# Patient Record
Sex: MALE | Race: WHITE | Employment: FULL TIME | URBAN - METROPOLITAN AREA
[De-identification: names, ages, dates, MRNs, and addresses within clinical notes are randomized per-mention and may not be internally consistent; named-entity substitution may affect disease eponyms.]

---

## 2017-04-17 ENCOUNTER — APPOINTMENT (OUTPATIENT)
Dept: GENERAL RADIOLOGY | Age: 57
End: 2017-04-17
Attending: EMERGENCY MEDICINE
Payer: COMMERCIAL

## 2017-04-17 ENCOUNTER — HOSPITAL ENCOUNTER (OUTPATIENT)
Age: 57
Discharge: HOME | End: 2017-04-17
Attending: EMERGENCY MEDICINE
Payer: COMMERCIAL

## 2017-04-17 VITALS
DIASTOLIC BLOOD PRESSURE: 79 MMHG | OXYGEN SATURATION: 98 % | HEART RATE: 104 BPM | RESPIRATION RATE: 18 BRPM | WEIGHT: 150 LBS | BODY MASS INDEX: 22.73 KG/M2 | TEMPERATURE: 99 F | HEIGHT: 68 IN | SYSTOLIC BLOOD PRESSURE: 152 MMHG

## 2017-04-17 DIAGNOSIS — S63.634A SPRAIN OF INTERPHALANGEAL JOINT OF RIGHT RING FINGER, INITIAL ENCOUNTER: Primary | ICD-10-CM

## 2017-04-17 DIAGNOSIS — S60.221A CONTUSION OF RIGHT HAND, INITIAL ENCOUNTER: ICD-10-CM

## 2017-04-17 PROCEDURE — 73140 X-RAY EXAM OF FINGER(S): CPT

## 2017-04-17 PROCEDURE — 99203 OFFICE O/P NEW LOW 30 MIN: CPT

## 2017-04-17 NOTE — ED INITIAL ASSESSMENT (HPI)
Pt sts he tripped down a flight of stairs last night and caught himself with his hand. +swelling and ecchymosis to the 4th digit on the right hand. +numbness/tingling to finger.

## 2017-04-17 NOTE — ED NOTES
Patient offered a finger splint. Pt sts \"Can I see what one looks like so i can go buy one myself so it will be cheaper\" pt showed finger splint. Pt verbalized understanding as to what to purchase.

## 2017-04-17 NOTE — ED PROVIDER NOTES
Patient Seen in: [facility]    History   Patient presents with:  Hand Pain    Stated Complaint: RT.  HAND PAIN    HPI  Patient states his slipper caught on the edge of the vacuum  and he fell onto an outstretched ha Current:/79 mmHg  Pulse 104  Temp(Src) 98.8 °F (37.1 °C) (Oral)  Resp 18  Ht 172.7 cm (5' 8\")  Wt 68.04 kg  BMI 22.81 kg/m2  SpO2 98%        Physical Exam   Constitutional: He is oriented to person, place, and time.  He appears well-developed and wel Disposition:  Discharge    Follow-up:  Antelmo Harper MD  [place] [id number]    Schedule an appointment as soon as possible for a visit in 3 days  Follow up with your doctor is important      Medi

## 2017-06-12 PROCEDURE — 88305 TISSUE EXAM BY PATHOLOGIST: CPT | Performed by: INTERNAL MEDICINE

## 2018-12-12 PROCEDURE — 88304 TISSUE EXAM BY PATHOLOGIST: CPT

## 2019-08-01 ENCOUNTER — HOSPITAL ENCOUNTER (OUTPATIENT)
Dept: GENERAL RADIOLOGY | Facility: HOSPITAL | Age: 59
Discharge: HOME | End: 2019-08-01
Attending: ORTHOPAEDIC SURGERY
Payer: COMMERCIAL

## 2019-08-01 ENCOUNTER — OFFICE VISIT (OUTPATIENT)
Dept: ORTHOPEDICS CLINIC | Facility: CLINIC | Age: 59
End: 2019-08-01
Payer: COMMERCIAL

## 2019-08-01 VITALS — DIASTOLIC BLOOD PRESSURE: 78 MMHG | RESPIRATION RATE: 18 BRPM | SYSTOLIC BLOOD PRESSURE: 126 MMHG | HEART RATE: 71 BPM

## 2019-08-01 DIAGNOSIS — M25.511 CHRONIC RIGHT SHOULDER PAIN: ICD-10-CM

## 2019-08-01 DIAGNOSIS — G89.29 CHRONIC RIGHT SHOULDER PAIN: ICD-10-CM

## 2019-08-01 DIAGNOSIS — M65.331 TRIGGER MIDDLE FINGER OF RIGHT HAND: Primary | ICD-10-CM

## 2019-08-01 DIAGNOSIS — M24.111 LABRAL TEAR OF SHOULDER, DEGENERATIVE, RIGHT: ICD-10-CM

## 2019-08-01 PROCEDURE — 20550 NJX 1 TENDON SHEATH/LIGAMENT: CPT | Performed by: ORTHOPAEDIC SURGERY

## 2019-08-01 PROCEDURE — 99203 OFFICE O/P NEW LOW 30 MIN: CPT | Performed by: ORTHOPAEDIC SURGERY

## 2019-08-01 PROCEDURE — 73030 X-RAY EXAM OF SHOULDER: CPT | Performed by: ORTHOPAEDIC SURGERY

## 2019-08-01 RX ORDER — BETAMETHASONE SODIUM PHOSPHATE AND BETAMETHASONE ACETATE 3; 3 MG/ML; MG/ML
3 INJECTION, SUSPENSION INTRA-ARTICULAR; INTRALESIONAL; INTRAMUSCULAR; SOFT TISSUE ONCE
Status: COMPLETED | OUTPATIENT
Start: 2019-08-01 | End: 2019-08-01

## 2019-08-01 RX ADMIN — BETAMETHASONE SODIUM PHOSPHATE AND BETAMETHASONE ACETATE 3 MG: 3; 3 INJECTION, SUSPENSION INTRA-ARTICULAR; INTRALESIONAL; INTRAMUSCULAR; SOFT TISSUE at 09:19:00

## 2019-08-01 NOTE — PROGRESS NOTES
Per verbal order from KEATON, draw up 1ml of 1% lidocaine and 1ml of Celestone for cortisone injection to right middle finger.  Alanna Biggs

## 2019-08-01 NOTE — PROGRESS NOTES
8/1/2019  Arleen Fire  106/1960  60year old   male  Samson Lainez MD    HPI:   Patient presents with:  Consult: C/o on/off right hand pain for over a month. Also, notices tingling and numbness in his fingers.   Stts when he wakes up in the morning SYSTEMS:   A 12 point review of systems was performed as documented on the intake form and reviewed by me today with pertinent positives and negatives listed in the HPI.     EXAM:   /78 (BP Location: Left arm)   Pulse 71   Resp 18   The patient is hoa evidence of generalized laxity.   He has joint line tenderness without effusion with fine crepitus    IMAGING AND TESTING:  Xrays of the patient's right shoulder with 3 views taken today and reviewed by me reveal overall normal alignment of the glenohumeral

## 2019-08-01 NOTE — PROCEDURES
The patient desired injection. Under sterile conditions and following informed consent, the patient was injected with lidocaine and celestone in their right middle finger flexor tendon sheath.   The patient tolerated the procedure well and there were no co

## 2019-08-02 ENCOUNTER — TELEPHONE (OUTPATIENT)
Dept: ORTHOPEDICS CLINIC | Facility: CLINIC | Age: 59
End: 2019-08-02

## 2019-08-02 NOTE — TELEPHONE ENCOUNTER
Pt procedure/Testing: MRI SHOULDER ARTHROGRAM(CONTRAST ONLY),RIGHT SH  Pt insurance contacted: [employer]                                                                   Rep. Contacted: [email or web address]      Dx: M24.111 - Labral tear of shoulder, degjoelle

## 2019-08-12 ENCOUNTER — HOSPITAL ENCOUNTER (OUTPATIENT)
Dept: GENERAL RADIOLOGY | Facility: HOSPITAL | Age: 59
Discharge: HOME | End: 2019-08-12
Attending: ORTHOPAEDIC SURGERY
Payer: COMMERCIAL

## 2019-08-12 ENCOUNTER — HOSPITAL ENCOUNTER (OUTPATIENT)
Dept: MRI IMAGING | Facility: HOSPITAL | Age: 59
Discharge: HOME | End: 2019-08-12
Attending: ORTHOPAEDIC SURGERY
Payer: COMMERCIAL

## 2019-08-12 DIAGNOSIS — M24.111 LABRAL TEAR OF SHOULDER, DEGENERATIVE, RIGHT: ICD-10-CM

## 2019-08-12 DIAGNOSIS — M25.511 CHRONIC RIGHT SHOULDER PAIN: ICD-10-CM

## 2019-08-12 DIAGNOSIS — G89.29 CHRONIC RIGHT SHOULDER PAIN: ICD-10-CM

## 2019-08-12 PROCEDURE — A9575 INJ GADOTERATE MEGLUMI 0.1ML: HCPCS | Performed by: ORTHOPAEDIC SURGERY

## 2019-08-12 PROCEDURE — 77002 NEEDLE LOCALIZATION BY XRAY: CPT | Performed by: ORTHOPAEDIC SURGERY

## 2019-08-12 PROCEDURE — 23350 INJECTION FOR SHOULDER X-RAY: CPT | Performed by: ORTHOPAEDIC SURGERY

## 2019-08-12 PROCEDURE — 73222 MRI JOINT UPR EXTREM W/DYE: CPT | Performed by: ORTHOPAEDIC SURGERY

## 2019-08-12 RX ORDER — LIDOCAINE HYDROCHLORIDE 10 MG/ML
INJECTION, SOLUTION EPIDURAL; INFILTRATION; INTRACAUDAL; PERINEURAL
Status: DISCONTINUED
Start: 2019-08-12 | End: 2019-08-12 | Stop reason: WASHOUT

## 2019-08-28 PROBLEM — M75.121 NONTRAUMATIC COMPLETE TEAR OF RIGHT ROTATOR CUFF: Status: ACTIVE | Noted: 2019-08-28

## 2020-04-16 PROBLEM — I25.10 CORONARY ARTERY CALCIFICATION: Status: ACTIVE | Noted: 2020-04-16

## 2020-04-16 PROBLEM — M65.312 TRIGGER FINGER OF LEFT THUMB: Status: ACTIVE | Noted: 2020-04-16

## 2020-04-16 PROBLEM — I25.84 CORONARY ARTERY CALCIFICATION: Status: ACTIVE | Noted: 2020-04-16

## 2020-09-18 PROBLEM — Z86.16 HISTORY OF 2019 NOVEL CORONAVIRUS DISEASE (COVID-19): Status: ACTIVE | Noted: 2020-09-18

## 2021-04-08 ENCOUNTER — IMMUNIZATION (OUTPATIENT)
Dept: LAB | Age: 61
End: 2021-04-08
Attending: HOSPITALIST
Payer: COMMERCIAL

## 2021-04-08 DIAGNOSIS — Z23 NEED FOR VACCINATION: Primary | ICD-10-CM

## 2021-04-08 PROCEDURE — 0001A SARSCOV2 VAC 30MCG/0.3ML IM: CPT

## 2021-04-29 ENCOUNTER — IMMUNIZATION (OUTPATIENT)
Dept: LAB | Age: 61
End: 2021-04-29
Attending: HOSPITALIST
Payer: COMMERCIAL

## 2021-04-29 DIAGNOSIS — Z23 NEED FOR VACCINATION: Primary | ICD-10-CM

## 2021-04-29 PROCEDURE — 0002A SARSCOV2 VAC 30MCG/0.3ML IM: CPT

## 2021-12-23 PROBLEM — M65.332 TRIGGER MIDDLE FINGER OF LEFT HAND: Status: ACTIVE | Noted: 2021-12-23

## 2022-01-31 PROBLEM — M65.352 TRIGGER FINGER, LEFT LITTLE FINGER: Status: ACTIVE | Noted: 2022-01-31

## 2022-01-31 PROBLEM — M75.101 ROTATOR CUFF SYNDROME OF RIGHT SHOULDER: Status: ACTIVE | Noted: 2022-01-31

## 2022-01-31 PROBLEM — G56.23 CUBITAL TUNNEL SYNDROME OF BOTH UPPER EXTREMITIES: Status: ACTIVE | Noted: 2022-01-31

## 2022-01-31 PROBLEM — G56.03 BILATERAL CARPAL TUNNEL SYNDROME: Status: ACTIVE | Noted: 2022-01-31

## (undated) NOTE — ED AVS SNAPSHOT
[facility] in [address] [place] [address] Patrick Willson    Phone:  [phone]    Fax:  [phone]           Heather Gee   MRN: [id number]    Department:  [facility] in [address] [place] [address]   Date of Visit:  4 follow-up as instructed. Your diagnosis was made based on your presentation today. Symptoms may change or worsen over time, or new symptoms my develop. If you are not improving as expected see your doctor or return sooner than  we discussed.  If your are care or specialist physician may see patients referred from the [facility]. Follow-up care is at the discretion of that Physician.   If you need additional assistance selecting a physician, you may call the Zee Fallon Michael [facility] [address] [facility] ([address] [place] [address]) [phone]   Nikkie Pandya [address] [employer] ([facility]) [address] [facility] [address] Virginie Brody  ([place] [address]) [phone]                Additional Information